# Patient Record
Sex: FEMALE | Race: WHITE | NOT HISPANIC OR LATINO | URBAN - METROPOLITAN AREA
[De-identification: names, ages, dates, MRNs, and addresses within clinical notes are randomized per-mention and may not be internally consistent; named-entity substitution may affect disease eponyms.]

---

## 2019-12-06 ENCOUNTER — TELEPHONE (OUTPATIENT)
Dept: URGENT CARE | Facility: CLINIC | Age: 49
End: 2019-12-06

## 2025-05-14 ENCOUNTER — OFFICE VISIT (OUTPATIENT)
Dept: FAMILY MEDICINE CLINIC | Facility: CLINIC | Age: 55
End: 2025-05-14
Payer: COMMERCIAL

## 2025-05-14 VITALS
WEIGHT: 146 LBS | SYSTOLIC BLOOD PRESSURE: 112 MMHG | OXYGEN SATURATION: 99 % | BODY MASS INDEX: 22.13 KG/M2 | HEIGHT: 68 IN | DIASTOLIC BLOOD PRESSURE: 73 MMHG | HEART RATE: 67 BPM

## 2025-05-14 DIAGNOSIS — Z00.00 GENERAL MEDICAL EXAM: Primary | ICD-10-CM

## 2025-05-14 DIAGNOSIS — Z12.11 COLON CANCER SCREENING: ICD-10-CM

## 2025-05-14 DIAGNOSIS — Z12.31 ENCOUNTER FOR SCREENING MAMMOGRAM FOR MALIGNANT NEOPLASM OF BREAST: ICD-10-CM

## 2025-05-14 RX ORDER — FLUTICASONE PROPIONATE 50 MCG
1 SPRAY, SUSPENSION (ML) NASAL DAILY
COMMUNITY

## 2025-05-14 RX ORDER — VALACYCLOVIR HYDROCHLORIDE 1 G/1
TABLET, FILM COATED ORAL
COMMUNITY
Start: 2025-03-28

## 2025-05-14 RX ORDER — ESTRADIOL 0.04 MG/D
1 PATCH, EXTENDED RELEASE TRANSDERMAL 2 TIMES WEEKLY
COMMUNITY
Start: 2025-05-08

## 2025-05-14 NOTE — PROGRESS NOTES
Chief Complaint  Establish Care    Subjective        Anny Garcia presents to [facility]       The patient is a 55-year-old female who presents to [facility].    She has been utilizing urgent care services intermittently and is seeking to reestablish regular primary care. She is currently on hormone replacement therapy, including Mirena for endometrial protection and a transdermal estrogen patch. She is under the care of a gynecologist who manages her mammograms and Pap smears. Her last mammogram was in 2023, and she is uncertain about having one in 2024. She had a Pap smear in 2019 and had her IUD replaced in 2021, with the next replacement due in 2026. She plans to have her next Pap smear in November 2025. She has completed her first colon cancer screening and believes she is due for another, as she received a reminder. She underwent Cologuard testing prior to 2020. She has had three basal cell carcinomas removed and now uses sunscreen more frequently. She has an upcoming appointment with her dermatologist next week due to a new spot she has noticed. She has received one shingles vaccine and needs to return for the second dose. She has had two episodes of shingles, one during college and another 12 to 15 years ago. The latter episode was managed with antibiotics prescribed by her dermatologist friend, who also recommended an ophthalmology consultation. She did not experience any complications from this episode. She uses Flonase nasal spray as needed for allergies and Lumigan eyedrops for preventative glaucoma care, as both her parents have glaucoma. Her intraocular pressures are well-controlled, and she maintains regular follow-ups with her ophthalmologist. She uses metronidazole for rosacea, which has recently flared up but is now under control. She takes Valtrex for fever blisters as needed, with a dosage of two tablets twice daily for one day at the onset of symptoms. She  had Mohs surgery in 2022. She reports no headaches, dizziness, or visual changes. She has not seen a dentist in the past year but has an appointment scheduled for next month. She reports no chest pain, shortness of breath, nausea, vomiting, diarrhea, or constipation. She experiences occasional urinary incontinence when jumping but does not consider it problematic. She reports no sexual health concerns and declines STD testing today. She reports no rashes, skin changes, lumps, or bumps beyond those already being managed by her dermatologist. She reports no joint aches or pains that limit her activity. She maintains a healthy diet, typically consuming cottage cheese, cucumber, watermelon, or other fruits around noon, and chicken or salmon with salad in the evening. She occasionally snacks and primarily drinks water throughout the day. She engages in yoga and spin classes for physical activity. She rolled her ankle in January 2025 and has not been running since then. She reports a stable mood and feels she has a good social support network.    She has SI joint pain and consulted her orthopedic specialist last year due to suspected nerve impingement. He reassured her that her hips, back, and spine were normal and advised her to increase her core strength. She attends spin class at least three times a week and was advised to balance this with other exercises. She plans to consult her physical therapist friend for exercise recommendations. She finds that yoga helps alleviate her pain. She attended one yoga session last week and another this week but is experiencing back pain today.    SOCIAL HISTORY  She does not smoke or use smokeless tobacco. She consumes a couple of social wine glasses here and there. She does not use recreational drugs.    FAMILY HISTORY  Her grandmother had colon cancer. Her mother had an ileostomy and a colostomy and later had a stroke. Her mother has had spots removed from both the skin and colon  "polyps. Both her parents have glaucoma. Her mother has thyroid disease. Her father had liver cancer and was not a drinker.    ALLERGIES  The patient has no known drug allergies.    MEDICATIONS  Current: Mirena, transdermal estrogen patch, Flonase (as needed), Lumigan, metronidazole, Valtrex (as needed)    IMMUNIZATIONS  She has had one shingles shot and needs to go back for a second shingles shot.    History of Present Illness    Objective   Vital Signs:  /73   Pulse 67   Ht 172.7 cm (67.99\")   Wt 66.2 kg (146 lb)   SpO2 99%   BMI 22.20 kg/m²   Estimated body mass index is 22.2 kg/m² as calculated from the following:    Height as of this encounter: 172.7 cm (67.99\").    Weight as of this encounter: 66.2 kg (146 lb).    BMI is within normal parameters. No other follow-up for BMI required.      Physical Exam  Constitutional:       Appearance: Normal appearance.   HENT:      Head: Normocephalic and atraumatic.      Nose: Nose normal.      Mouth/Throat:      Mouth: Mucous membranes are moist.   Eyes:      General: Lids are normal.      Conjunctiva/sclera: Conjunctivae normal.   Cardiovascular:      Rate and Rhythm: Normal rate and regular rhythm.      Heart sounds: Normal heart sounds.   Pulmonary:      Effort: Pulmonary effort is normal.      Breath sounds: Normal breath sounds.   Musculoskeletal:      Cervical back: Normal range of motion.   Skin:     General: Skin is warm and dry.   Neurological:      General: No focal deficit present.      Mental Status: She is alert and oriented to person, place, and time.      Gait: Gait is intact.   Psychiatric:         Mood and Affect: Mood normal.         Behavior: Behavior normal.         Thought Content: Thought content normal.        Result Review :               Results         Assessment and Plan        1. Health maintenance.  Her blood pressure is well-regulated at 112/73, and she maintains a healthy weight. The last recorded mammogram was conducted in 2023, " and the most recent Pap smear was performed in 2019. A Cologuard test was completed in 2021. Her last Mohs surgery was performed in 2022. A comprehensive discussion regarding the benefits and risks associated with frequent screening was conducted. She was informed that the [place] Preventive Services Task Force recommends biennial mammograms starting at age 50, while the American College of Radiology and the American Cancer Society advocate for annual mammograms starting at age 40. Given her family history of thyroid disease, a thyroid function test will be ordered today. For routine health and wellness screening, a cholesterol check, metabolic panel, random blood glucose test, kidney function test, and liver enzyme test will be conducted. Her tetanus and pneumococcal vaccines will be updated today. An order for a mammogram will be placed today. A Cologuard test will be ordered today. She will return for her second shingles shot.    2. Sacroiliac joint pain.  She was provided with a home exercise regimen that includes bird dogs and dead bugs exercises.    Diagnoses and all orders for this visit:    1. Encounter for screening mammogram for malignant neoplasm of breast (Primary)  -     Mammo Screening Digital Tomosynthesis Bilateral With CAD; Future    2. Colon cancer screening  -     Cologuard - Stool, Per Rectum; Future    3. General medical exam  -     TSH Rfx On Abnormal To Free T4  -     Comprehensive metabolic panel  -     Lipid Panel  -     Hepatitis C Antibody    Other orders  -     Tdap Vaccine => 8yo IM (BOOSTRIX/ADACEL)  -     Pneumococcal Conjugate Vaccine 20-Valent (PCV20)             Follow Up   No follow-ups on file.  Patient was given instructions and counseling regarding her condition or for health maintenance advice. Please see specific information pulled into the AVS if appropriate.     Patient or patient representative verbalized consent for the use of Ambient Listening during the visit with   Aziza Wynn MD for chart documentation. 5/14/2025  16:22 EDT

## 2025-05-15 ENCOUNTER — PATIENT ROUNDING (BHMG ONLY) (OUTPATIENT)
Dept: FAMILY MEDICINE CLINIC | Facility: CLINIC | Age: 55
End: 2025-05-15
Payer: COMMERCIAL

## 2025-05-15 LAB
ALBUMIN SERPL-MCNC: 4.4 G/DL (ref 3.5–5.2)
ALBUMIN/GLOB SERPL: 1.9 G/DL
ALP SERPL-CCNC: 64 U/L (ref 39–117)
ALT SERPL-CCNC: 11 U/L (ref 1–33)
AST SERPL-CCNC: 21 U/L (ref 1–32)
BILIRUB SERPL-MCNC: 0.6 MG/DL (ref 0–1.2)
BUN SERPL-MCNC: 15 MG/DL (ref 6–20)
BUN/CREAT SERPL: 17.6 (ref 7–25)
CALCIUM SERPL-MCNC: 9.3 MG/DL (ref 8.6–10.5)
CHLORIDE SERPL-SCNC: 103 MMOL/L (ref 98–107)
CHOLEST SERPL-MCNC: 199 MG/DL (ref 0–200)
CO2 SERPL-SCNC: 26.8 MMOL/L (ref 22–29)
CREAT SERPL-MCNC: 0.85 MG/DL (ref 0.57–1)
EGFRCR SERPLBLD CKD-EPI 2021: 80.5 ML/MIN/1.73
GLOBULIN SER CALC-MCNC: 2.3 GM/DL
GLUCOSE SERPL-MCNC: 91 MG/DL (ref 65–99)
HCV IGG SERPL QL IA: NON REACTIVE
HDLC SERPL-MCNC: 84 MG/DL (ref 40–60)
LDLC SERPL CALC-MCNC: 101 MG/DL (ref 0–100)
POTASSIUM SERPL-SCNC: 4.4 MMOL/L (ref 3.5–5.2)
PROT SERPL-MCNC: 6.7 G/DL (ref 6–8.5)
SODIUM SERPL-SCNC: 139 MMOL/L (ref 136–145)
TRIGL SERPL-MCNC: 78 MG/DL (ref 0–150)
TSH SERPL DL<=0.005 MIU/L-ACNC: 0.84 UIU/ML (ref 0.27–4.2)
VLDLC SERPL CALC-MCNC: 14 MG/DL (ref 5–40)

## 2025-05-15 NOTE — PROGRESS NOTES
Good afternoon Mrs. Garcia,    My name is India, I am a medical assistant here at Dr. Wynn's office. We hope your recent visit with us went smoothly.     If you have any questions or concerns, please feel free to reach out at [phone]. Please take the time to fill out the survey that will be sent to you to help us better your care. Thank you and have a great day!     CAMRON Osborne